# Patient Record
Sex: MALE | Race: BLACK OR AFRICAN AMERICAN | NOT HISPANIC OR LATINO | URBAN - METROPOLITAN AREA
[De-identification: names, ages, dates, MRNs, and addresses within clinical notes are randomized per-mention and may not be internally consistent; named-entity substitution may affect disease eponyms.]

---

## 2019-01-23 ENCOUNTER — OFFICE (OUTPATIENT)
Dept: URBAN - METROPOLITAN AREA CLINIC 101 | Facility: CLINIC | Age: 59
End: 2019-01-23
Payer: MEDICAID

## 2019-01-23 VITALS
SYSTOLIC BLOOD PRESSURE: 131 MMHG | HEART RATE: 84 BPM | WEIGHT: 261 LBS | TEMPERATURE: 97.7 F | DIASTOLIC BLOOD PRESSURE: 92 MMHG | HEIGHT: 71 IN

## 2019-01-23 DIAGNOSIS — Z12.11 ENCOUNTER FOR SCREENING FOR MALIGNANT NEOPLASM OF COLON: ICD-10-CM

## 2019-01-23 DIAGNOSIS — G47.30 SLEEP APNEA, UNSPECIFIED: ICD-10-CM

## 2019-01-23 DIAGNOSIS — I25.10 ATHEROSCLEROTIC HEART DISEASE OF NATIVE CORONARY ARTERY WITH: ICD-10-CM

## 2019-01-23 DIAGNOSIS — N18.6 END STAGE RENAL DISEASE: ICD-10-CM

## 2019-01-23 PROCEDURE — 99203 OFFICE O/P NEW LOW 30 MIN: CPT

## 2019-01-23 NOTE — SERVICEHPINOTES
BURT BRIGGS   is a   58   year old male who is being seen in consultation at the request of   ARY FLYNN   for OV prior to colonoscopy. His last colonoscopy 10/2013, normal but recall 5 years due to inadequate prep. He previously had issues with constipation but states recently he has been ok as he knows to avoid certain foods that give him issues. BMs 1-2x/day, BSS type 3-4. Occasional brbpr when wiping. No dark stool. No abdominal pain, weight loss. No family hx of colon cancer or polyps. Durga has ESRD, on dialysis since 2006. He needs a colonoscopy prior to being put on transplant list. He had an MI in July 2018 had stent placed--takes daily ASA 81mg but no other blood thinners. He denies any chest pain, SOB, or palpitations. He has SID, needs to get a new mask and is planning on seeing PCP about this soon. BR

## 2020-01-14 ENCOUNTER — ON CAMPUS - OUTPATIENT (OUTPATIENT)
Dept: URBAN - METROPOLITAN AREA HOSPITAL 35 | Facility: HOSPITAL | Age: 60
End: 2020-01-14
Payer: MEDICARE

## 2020-01-14 DIAGNOSIS — Z12.11 ENCOUNTER FOR SCREENING FOR MALIGNANT NEOPLASM OF COLON: ICD-10-CM

## 2020-01-14 PROCEDURE — G0121 COLON CA SCRN NOT HI RSK IND: HCPCS
